# Patient Record
Sex: FEMALE | Race: WHITE
[De-identification: names, ages, dates, MRNs, and addresses within clinical notes are randomized per-mention and may not be internally consistent; named-entity substitution may affect disease eponyms.]

---

## 2018-06-05 ENCOUNTER — HOSPITAL ENCOUNTER (OUTPATIENT)
Dept: HOSPITAL 68 - STS | Age: 73
End: 2018-06-05
Payer: COMMERCIAL

## 2018-06-05 VITALS — BODY MASS INDEX: 39.27 KG/M2 | HEIGHT: 60 IN | WEIGHT: 200 LBS

## 2018-06-05 DIAGNOSIS — M19.90: ICD-10-CM

## 2018-06-05 DIAGNOSIS — K21.9: ICD-10-CM

## 2018-06-05 DIAGNOSIS — H52.4: ICD-10-CM

## 2018-06-05 DIAGNOSIS — H25.89: Primary | ICD-10-CM

## 2018-06-05 DIAGNOSIS — N32.9: ICD-10-CM

## 2018-06-05 DIAGNOSIS — E03.9: ICD-10-CM

## 2018-06-05 PROCEDURE — V2632 POST CHMBR INTRAOCULAR LENS: HCPCS

## 2018-06-05 NOTE — OPERATIVE REPORT
Operative/Inv Procedure Report
Surgery Date: 06/05/18
Name of Procedure:
Cataract extraction lens implantation correction of presbyopia left eye
Pre-Operative Diagnosis:
Age-related cataract and presbyopia left eye 20/30 vision 20/150 glare vision
Post-Operative Diagnosis:
Same
Estimated Blood Loss: none
Surgeon/Assistant:
Jeff Harris MD
 
Anesthesia: local monitored anesthesi
Complications:
None
 
Operative/Procedure Note
Note:
The patient was brought to the operating room standard monitoring equipment was 
attached the patient was prepped and draped in the usual fashion for intraocular
surgery.  A lid speculum was placed to retract the lids.  The case was begun by 
making a temporal incision with a 2.4 mm keratome.  The eye was stabilized with 
a Todd ring during this incision.  1 mL of non-preserved lidocaine was 
introduced into the anterior chamber to provide anesthesia.  The anterior 
chamber was then filled and deepened with viscoelastic.  A curvilinear 
capsulorrhexis was achieved using a 30-gauge needle and is a cystotome and 
capsulorrhexis was finished using a Utr[employer] forceps.  A second or paracentesis 
incision was made temporally with a 1 mm MVR blade.  The lens was then 
hydrodissected with balanced salt solution and found to be rotatable.  The lens 
was emulsified using phacoemulsification and a modified four-quadrant cracking 
technique.  The residual cortical material was removed using automated 
irrigation and aspiration and as much of the anterior capsular rim was cleaned 
as well as possible.  The posterior capsule was cleaned first with the automated
machine on a low setting and then manually with a Nils squeegee.  The capsular 
bag was deepened with viscoelastic.  The lens a Technis multifocal ZLB00 22.5  
Diopter placed into the bag under direct visualization and rotated so that the 
haptics were at 12 and 6:00.  Viscoelastic was then removed from the eye by 
flushing it out and then by automated irrigation and aspiration.  The eye was 
pressurized to a normal tone.  1/10 of a cc of cefuroxime solution was 
introduced into the anterior chamber to provide antibiotic prophylaxis.  The 
wounds were sealed by hydrating the stroma adjacent to them and the eye was left
at a proper tone after the wounds were checked and found not to be leaking.  The
lid speculum was removed from the orbit.  Antibiotic and steroid drops were 
placed on the eye and then the eye was shielded.  Monitoring equipment was 
removed from the patient and the patient was removed from the operative suite to
the holding area.  The patient tolerated the procedure well and will be seen in 
the office tomorrow.